# Patient Record
Sex: MALE | Race: WHITE | NOT HISPANIC OR LATINO | Employment: OTHER | URBAN - METROPOLITAN AREA
[De-identification: names, ages, dates, MRNs, and addresses within clinical notes are randomized per-mention and may not be internally consistent; named-entity substitution may affect disease eponyms.]

---

## 2022-03-23 ENCOUNTER — APPOINTMENT (OUTPATIENT)
Dept: RADIOLOGY | Facility: MEDICAL CENTER | Age: 72
End: 2022-03-23
Payer: MEDICARE

## 2022-03-23 ENCOUNTER — OFFICE VISIT (OUTPATIENT)
Dept: URGENT CARE | Facility: MEDICAL CENTER | Age: 72
End: 2022-03-23
Payer: MEDICARE

## 2022-03-23 ENCOUNTER — TELEPHONE (OUTPATIENT)
Dept: URGENT CARE | Facility: MEDICAL CENTER | Age: 72
End: 2022-03-23

## 2022-03-23 VITALS
DIASTOLIC BLOOD PRESSURE: 75 MMHG | HEIGHT: 67 IN | HEART RATE: 66 BPM | SYSTOLIC BLOOD PRESSURE: 129 MMHG | BODY MASS INDEX: 28.56 KG/M2 | OXYGEN SATURATION: 94 % | RESPIRATION RATE: 18 BRPM | TEMPERATURE: 98.3 F | WEIGHT: 182 LBS

## 2022-03-23 DIAGNOSIS — S16.1XXA STRAIN OF NECK MUSCLE, INITIAL ENCOUNTER: ICD-10-CM

## 2022-03-23 DIAGNOSIS — T14.8XXA CONTUSION OF LEFT CLAVICLE, INITIAL ENCOUNTER: ICD-10-CM

## 2022-03-23 DIAGNOSIS — S40.012A CONTUSION OF LEFT SHOULDER, INITIAL ENCOUNTER: ICD-10-CM

## 2022-03-23 DIAGNOSIS — S00.33XA CONTUSION OF NOSE, INITIAL ENCOUNTER: Primary | ICD-10-CM

## 2022-03-23 DIAGNOSIS — S50.02XA CONTUSION OF LEFT ELBOW, INITIAL ENCOUNTER: ICD-10-CM

## 2022-03-23 DIAGNOSIS — S00.33XA CONTUSION OF NOSE, INITIAL ENCOUNTER: ICD-10-CM

## 2022-03-23 DIAGNOSIS — S60.212A CONTUSION OF LEFT WRIST, INITIAL ENCOUNTER: ICD-10-CM

## 2022-03-23 PROCEDURE — 99213 OFFICE O/P EST LOW 20 MIN: CPT | Performed by: PHYSICIAN ASSISTANT

## 2022-03-23 PROCEDURE — 70160 X-RAY EXAM OF NASAL BONES: CPT

## 2022-03-23 PROCEDURE — 73030 X-RAY EXAM OF SHOULDER: CPT

## 2022-03-23 PROCEDURE — 73110 X-RAY EXAM OF WRIST: CPT

## 2022-03-23 PROCEDURE — 73000 X-RAY EXAM OF COLLAR BONE: CPT

## 2022-03-23 PROCEDURE — 73080 X-RAY EXAM OF ELBOW: CPT

## 2022-03-23 PROCEDURE — 72040 X-RAY EXAM NECK SPINE 2-3 VW: CPT

## 2022-03-23 RX ORDER — VENLAFAXINE HYDROCHLORIDE 75 MG/1
CAPSULE, EXTENDED RELEASE ORAL
COMMUNITY
Start: 2022-02-13

## 2022-03-23 RX ORDER — VENLAFAXINE HYDROCHLORIDE 150 MG/1
CAPSULE, EXTENDED RELEASE ORAL
COMMUNITY
Start: 2022-02-23

## 2022-03-23 RX ORDER — GABAPENTIN 600 MG/1
600 TABLET ORAL 3 TIMES DAILY
COMMUNITY
Start: 2022-03-15 | End: 2022-04-14

## 2022-03-23 RX ORDER — QUETIAPINE FUMARATE 25 MG/1
TABLET, FILM COATED ORAL
COMMUNITY
Start: 2022-02-23

## 2022-03-23 RX ORDER — VENLAFAXINE HYDROCHLORIDE 150 MG/1
150 CAPSULE, EXTENDED RELEASE ORAL
COMMUNITY
Start: 2022-02-23

## 2022-03-23 RX ORDER — MECLIZINE HCL 12.5 MG/1
12.5 TABLET ORAL 3 TIMES DAILY PRN
COMMUNITY
Start: 2021-10-21

## 2022-03-23 RX ORDER — QUETIAPINE FUMARATE 100 MG/1
TABLET, FILM COATED ORAL
COMMUNITY
Start: 2022-03-14

## 2022-03-23 RX ORDER — HALOPERIDOL DECANOATE 100 MG/ML
100 INJECTION INTRAMUSCULAR
COMMUNITY
Start: 2021-12-29

## 2022-03-23 NOTE — PROGRESS NOTES
[address]        NAME: Eamon Flores is a 73 y o  male  : 1950    MRN: [id number]  DATE: 2022  TIME: 12:29 PM    Assessment and Plan   Contusion of nose, initial encounter [S00 33XA]  1  Contusion of nose, initial encounter  XR nasal bones   2  Contusion of left shoulder, initial encounter  XR shoulder 2+ vw left   3  Contusion of left clavicle, initial encounter  XR clavicle left   4  Contusion of left elbow, initial encounter  XR elbow 3+ vw left   5  Contusion of left wrist, initial encounter  XR wrist 3+ vw left   6  Strain of neck muscle, initial encounter  XR spine cervical 2 or 3 vw injury     Xray of nose- possible nasal fracture  pending radiology report  Cervical Xray - DDD  pending radiology report  Left clavicle- No acute fracture pending radiology report  Left shoulder- No acute fracture or dislocation, Pending radiology report  Left elbow- No acute fracture or dislocation pending radiology report  Left wrist xray- No acute fracture or dislocation  Pending radiology report  Patient seemed very confused and said he couldn't remember where he parked his car  Patient was told I recommend he go to ED for further evaluation  Patient asked for me to call his brother in law and sister, Nirav Juárez [phone]    Patient Instructions       Follow up with PCP in 3-5 days  Proceed to  ER if symptoms worsen  Chief Complaint     Chief Complaint   Patient presents with    Shoulder Pain     Patient c/o L shoulder pain with Limited range of motion x 3-4 days  Patient reports he feel out of bed aabout 3-4 days ago  History of Present Illness       Patient is here today complaining of left shoulder pain  Patient reports he fell out of bed two days go and landed on his face and since then he is having left arm pain  Patient reports he did have a headache after the fall but that is better  Patent reports he cant move left arm   Patient is not taking any blood thinners  Patient was told he should really go to ED but patient reports he would rather get xrays here  Shoulder Pain         Review of Systems   Review of Systems   Constitutional: Negative  Respiratory: Negative  Cardiovascular: Negative  Musculoskeletal: Positive for neck pain and neck stiffness  Left shoulder, neck, clavicle and left arm pain   Psychiatric/Behavioral: Negative  Current Medications       Current Outpatient Medications:     carbidopa-levodopa (SINEMET)  mg per tablet, Take 1 tablet by mouth 3 (three) times a day, Disp: , Rfl:     gabapentin (NEURONTIN) 600 MG tablet, Take 600 mg by mouth Three times a day, Disp: , Rfl:     haloperidol decanoate (Haldol Decanoate) 100 mg/mL injection, Inject 100 mg into a muscle every 30 (thirty) days, Disp: , Rfl:     meclizine (ANTIVERT) 12 5 MG tablet, Take 12 5 mg by mouth Three times daily as needed, Disp: , Rfl:     venlafaxine (EFFEXOR-XR) 150 mg 24 hr capsule, Take 150 mg by mouth, Disp: , Rfl:     QUEtiapine (SEROquel) 100 mg tablet, TAKE 1 TABLET BY MOUTH EVERY DAY AT NIGHT, Disp: , Rfl:     QUEtiapine (SEROquel) 25 mg tablet, TAKE 1 TABLET (25 MG TOTAL) BY MOUTH 3 (THREE) TIMES A DAY AS NEEDED (AGITATION/IRRITABILITY)  , Disp: , Rfl:     venlafaxine (EFFEXOR-XR) 150 mg 24 hr capsule, TAKE 1 CAPSULE (150 MG TOTAL) BY MOUTH EVERY MORNING   TAKE WITH THE EFFEXOR XR 75MG, Disp: , Rfl:     venlafaxine (EFFEXOR-XR) 75 mg 24 hr capsule, TAKE 1 CAPSULE (75 MG TOTAL) BY MOUTH EVERY MORNING  WITH 150 MG, Disp: , Rfl:     Current Allergies     Allergies as of 03/23/2022 - never reviewed   Allergen Reaction Noted    Aspirin GI Intolerance 04/11/2018    Clonazepam Other (See Comments) 04/14/2015    Fluoxetine Other (See Comments) 04/14/2015    Other Other (See Comments) 02/10/2014            The following portions of the patient's history were reviewed and updated as appropriate: allergies, current medications, past family history, past medical history, past social history, past surgical history and problem list      No past medical history on file  No past surgical history on file  No family history on file  Medications have been verified  Objective   /75   Pulse 66   Temp 98 3 °F (36 8 °C)   Resp 18   Ht 5' 7" (1 702 m)   Wt 82 6 kg (182 lb)   SpO2 94%   BMI 28 51 kg/m²   No LMP for male patient  Physical Exam     Physical Exam  Constitutional:       Appearance: He is normal weight  HENT:      Head: Normocephalic  Comments: No pain over right or left orbit     Nose:      Comments: Pain over nasal bone  Cardiovascular:      Rate and Rhythm: Normal rate and regular rhythm  Heart sounds: Normal heart sounds  Pulmonary:      Breath sounds: Normal breath sounds  Musculoskeletal:      Comments: Pain over cervical spine and cervical para-spinals  Exam limited in left arm due to pain  Left elbow flexion and extension intact  No pain over right hand  Pain with left hand flexion and extension  Neurological:      General: No focal deficit present  Mental Status: He is alert and oriented to person, place, and time     Psychiatric:         Mood and Affect: Mood normal          Behavior: Behavior normal

## 2022-03-23 NOTE — TELEPHONE ENCOUNTER
Patient was called  VM was full  Wrist xray states Suspected acute nondisplaced distal ulnar fracture and small acute lateral distal scaphoid avulsion fragment  Clinical correlation recommended  Patient when he calls back should come in to get a splint placed and refer to ortho

## 2022-03-24 ENCOUNTER — TELEPHONE (OUTPATIENT)
Dept: URGENT CARE | Facility: MEDICAL CENTER | Age: 72
End: 2022-03-24

## 2022-03-24 NOTE — TELEPHONE ENCOUNTER
Xray of left wrist- Suspected acute nondisplaced distal ulnar fracture and small acute lateral distal scaphoid avulsion fragment  Clinical correlation recommended  Patient  Was informed   Patient reports he is not having pain in left wrist  Patient was told if he is having pain to come get a splint applied and we will refer him to ortho  Patient understood       Patient was educated on icy patches

## 2022-03-24 NOTE — TELEPHONE ENCOUNTER
Patient was called and so was his sister Merari Hoyt-  [phone]  Neither person answered  Will try again later